# Patient Record
Sex: MALE | Race: WHITE | NOT HISPANIC OR LATINO | Employment: FULL TIME | URBAN - METROPOLITAN AREA
[De-identification: names, ages, dates, MRNs, and addresses within clinical notes are randomized per-mention and may not be internally consistent; named-entity substitution may affect disease eponyms.]

---

## 2017-01-11 ENCOUNTER — OFFICE VISIT (OUTPATIENT)
Dept: CARDIOLOGY | Facility: CLINIC | Age: 42
End: 2017-01-11

## 2017-01-11 VITALS
BODY MASS INDEX: 34.85 KG/M2 | HEART RATE: 79 BPM | SYSTOLIC BLOOD PRESSURE: 138 MMHG | HEIGHT: 73 IN | DIASTOLIC BLOOD PRESSURE: 100 MMHG | WEIGHT: 263 LBS

## 2017-01-11 DIAGNOSIS — R07.2 PRECORDIAL PAIN: ICD-10-CM

## 2017-01-11 DIAGNOSIS — R00.2 PALPITATIONS: Primary | ICD-10-CM

## 2017-01-11 PROCEDURE — 99204 OFFICE O/P NEW MOD 45 MIN: CPT | Performed by: INTERNAL MEDICINE

## 2017-01-11 PROCEDURE — 93000 ELECTROCARDIOGRAM COMPLETE: CPT | Performed by: INTERNAL MEDICINE

## 2017-01-11 RX ORDER — LISINOPRIL 40 MG/1
1 TABLET ORAL DAILY
Refills: 5 | COMMUNITY
Start: 2016-12-18

## 2017-01-11 RX ORDER — CLONIDINE HYDROCHLORIDE 0.2 MG/1
1 TABLET ORAL 3 TIMES DAILY
Refills: 5 | COMMUNITY
Start: 2016-12-30

## 2017-01-11 RX ORDER — AMLODIPINE BESYLATE 5 MG/1
1 TABLET ORAL DAILY
Refills: 5 | COMMUNITY
Start: 2016-12-18 | End: 2017-01-24

## 2017-01-11 RX ORDER — CLONAZEPAM 0.5 MG/1
TABLET ORAL
COMMUNITY
Start: 2017-01-10 | End: 2018-10-23

## 2017-01-11 RX ORDER — METOPROLOL SUCCINATE 100 MG/1
1 TABLET, EXTENDED RELEASE ORAL DAILY
Refills: 5 | COMMUNITY
Start: 2016-12-27
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
## 2017-01-11 NOTE — MR AVS SNAPSHOT
Jesús Saucedodylon   2017 2:40 PM   Office Visit    Dept Phone:  [phone]   Encounter #:  [id number]    Provider:  Brooks Maddox MD   Department:  [facility] CARDIOLOGY                Your Full Care Plan              Your Updated Medication List          This list is accurate as of: 17  3:36 PM.  Always use your most recent med list.                amLODIPine 5 MG tablet   Commonly known as:  NORVASC       clonazePAM 0.5 MG tablet   Commonly known as:  KlonoPIN       CloNIDine 0.2 MG tablet   Commonly known as:  CATAPRES       lisinopril 40 MG tablet   Commonly known as:  PRINIVIL,ZESTRIL       metFORMIN 1000 MG tablet   Commonly known as:  GLUCOPHAGE       metoprolol succinate  MG 24 hr tablet   Commonly known as:  TOPROL-XL               We Performed the Following     Holter Monitor - 24 Hour     Treadmill Stress Test - Rehab Protocol       You Were Diagnosed With        Codes Comments    Palpitations    -  Primary ICD-10-CM: R00.2  ICD-9-CM: 785.1       Instructions     None    Patient Instructions History      Upcoming Appointments     Visit Type Date Time Department    NEW PATIENT 2017  2:40 PM [facility] NANCY STRESS TEST ONLY [place] 2017  2:30 PM [facility] NANCY [facility] NUC CARD     NANCY HOLTER MONITOR 24 HOUR [place] 2017  3:00 PM [facility] CARD HOLTERS      [employer] Signup     [facility] [employer] allows you to send messages to your doctor, view your test results, renew your prescriptions, schedule appointments, and more. To sign up, go to [employer] and click on the Sign Up Now link in the New User? box. Enter your [employer] Activation Code exactly as it appears below along with the last four digits of your Social Security Number and your Date of Birth () to complete the sign-up process. If you do not sign up before the expiration date, you must request a new code.    [employer] Activation Code:  [email or web address]  Expires: 1/25/2017  3:36 PM    If you have questions, you can email [email or web address] or call [phone] to talk to our [employer] staff. Remember, [employer] is NOT to be used for urgent needs. For medical emergencies, dial 911.               Other Info from Your Visit           Your Appointments     Jan 20, 2017  2:30 PM EST   STRESS TEST ONLY VISIT with [facility] NUC STRESS LAB 1   [facility] NUC CARD ([place])    [facility] [address] [address] [phone]           No food or drink for 2 hours prior to your test. No caffeine or chocolate 24 hours prior to you test. (this includes coffee, tea, soda, all decaffeinated beverages, cappuccino flavorings, noooz or vivarian, diet medications, excedrin, anacin or any energy drinks) wear comfortable clothing and walking shoes. Bring your insurance cards with you. Bring all medications in their original bottles. If you are diabetic, do not take your diabetic medications after consulting with your primary care physician. if you take insulin, only take half the dose after consulting with your primary care physician. please hold the following medications for 48 hours prior to your test after consulting with your primary care physician. (acebutolo, aggrenox, atenolol, bisoprolol, betaxolol, betapace, calan, cardizem, carvadilol, coreg, corgard, corzide, dilacor xr, diltiazem, inderal, inderal la, isoptin, karlone, labetolol, levatol, nadolol, normodyne, penbutolol, pindolol, propanolol, sectral, sotalol, tenoretic, tiazic, timolol, bystolic, toprol xl, lopressor, metoprolol, trandata, verapamil, verelan, visken, zebeta, zisc)            Jan 20, 2017  3:00 PM EST   HOLTER MONITOR - 24 HOUR VISIT with [facility] LCG HOLTER   [facility] CARDIOLOGY ([facility])    [address] [address]                 Allergies     Penicillins        Reason for Visit     Chest Pain     Palpitations  "          Vital Signs     Blood Pressure Pulse Height Weight Body Mass Index Smoking Status    138/100 79 73\" (185.4 cm) 263 lb (119 kg) 34.7 kg/m2 Current Every Day Smoker      Problems and Diagnoses Noted     Palpitations    -  Primary        "

## 2017-01-20 ENCOUNTER — HOSPITAL ENCOUNTER (OUTPATIENT)
Dept: CARDIOLOGY | Facility: HOSPITAL | Age: 42
Discharge: HOME | End: 2017-01-20
Attending: INTERNAL MEDICINE | Admitting: INTERNAL MEDICINE

## 2017-01-20 DIAGNOSIS — R07.2 PRECORDIAL PAIN: Primary | ICD-10-CM

## 2017-01-20 DIAGNOSIS — I48.0 PAROXYSMAL ATRIAL FIBRILLATION (HCC): Primary | ICD-10-CM

## 2017-01-20 LAB
BH CV STRESS BP STAGE 1: NORMAL
BH CV STRESS BP STAGE 2: NORMAL
BH CV STRESS BP STAGE 3: NORMAL
BH CV STRESS BP STAGE 4: NORMAL
BH CV STRESS BP STAGE 5: NORMAL
BH CV STRESS BP STAGE 6: NORMAL
BH CV STRESS BP STAGE 7: NORMAL
BH CV STRESS DURATION MIN STAGE 1: 2
BH CV STRESS DURATION SEC STAGE 1: 32
BH CV STRESS GRADE STAGE 1: 10
BH CV STRESS HR STAGE 1: 181
BH CV STRESS METS STAGE 1: 5
BH CV STRESS PROTOCOL 1: NORMAL
BH CV STRESS RECOVERY BP: NORMAL MMHG
BH CV STRESS RECOVERY HR: 86 BPM
BH CV STRESS SPEED STAGE 1: 1.7
BH CV STRESS STAGE 1: 1
MAXIMAL PREDICTED HEART RATE: 179 BPM
PERCENT MAX PREDICTED HR: 101.12 %
STRESS BASELINE BP: NORMAL MMHG
STRESS BASELINE HR: 94 BPM
STRESS PERCENT HR: 119 %
STRESS POST ESTIMATED WORKLOAD: 3 METS
STRESS POST EXERCISE DUR MIN: 2 MIN
STRESS POST EXERCISE DUR SEC: 32 SEC
STRESS POST PEAK BP: NORMAL MMHG
STRESS POST PEAK HR: 181 BPM
STRESS TARGET HR: 152 BPM

## 2017-01-20 PROCEDURE — 93016 CV STRESS TEST SUPVJ ONLY: CPT | Performed by: INTERNAL MEDICINE

## 2017-01-20 PROCEDURE — 93017 CV STRESS TEST TRACING ONLY: CPT

## 2017-01-20 PROCEDURE — 93018 CV STRESS TEST I&R ONLY: CPT | Performed by: INTERNAL MEDICINE

## 2017-01-23 DIAGNOSIS — R07.2 PRECORDIAL PAIN: Primary | ICD-10-CM

## 2017-01-23 NOTE — PROGRESS NOTES
Kia,   The patient's cell phone has been disconnected.  Please see if we have any other contact information for him.  I want to see how his blood pressure is running, and also to tell him that we need to set him up for a different type of stress test that does not require the treadmill.  Because he went into atrial fibrillation with a rapid rate the treadmill was not helpful    I went ahead and placed the order for a [employer]

## 2017-01-24 RX ORDER — NIFEDIPINE 90 MG/1
90 TABLET, EXTENDED RELEASE ORAL DAILY
Qty: 30 TABLET | Refills: 6 | Status: SHIPPED | OUTPATIENT
Start: 2017-01-24 | End: 2017-09-05 | Stop reason: SDUPTHER

## 2017-01-27 DIAGNOSIS — I48.0 PAROXYSMAL ATRIAL FIBRILLATION (HCC): Primary | ICD-10-CM

## 2017-01-27 RX ORDER — AMIODARONE HYDROCHLORIDE 200 MG/1
TABLET ORAL
Qty: 49 TABLET | Refills: 5 | Status: SHIPPED | OUTPATIENT
Start: 2017-01-27 | End: 2017-02-23 | Stop reason: SDUPTHER

## 2017-02-01 ENCOUNTER — TELEPHONE (OUTPATIENT)
Dept: CARDIOLOGY | Facility: CLINIC | Age: 42
End: 2017-02-01

## 2017-02-01 NOTE — TELEPHONE ENCOUNTER
"02/01/17  10:51 AM  Jesús Guevara  1975    Home Phone [phone]   Mobile [phone]     This patient was transferred to me after the office contacted him to schedule his stress test. He is very anxious, and because of this, he has a hard time answering questions.     He states he feels like he's dying. He is SOA which he states is made worse by anxiety. He is sweaty and fatigued. This has been occurring since Monday. He states he has not felt the need to go to the ER. He states he has previously been seen in the ER at [facility] and that is how he was referred to our office. Because of this, he does not feel that going to the ER will be beneficial. He denies chest pain. He cannot tell me what his heart rate or blood pressure has been.     He states he is wearing an event monitor and he has been sending in multiple transmissions. You have a couple of transmissions in your inbox: one was NSR with HR in upper 90s and one was Afib/ Aflutter with HR in upper 90's. I see the note that amiodarone was added on Friday. I inquired about his medications; he is unable to tell me the names of the medications he is taking. However, he states that he did not get the message about adding a new medication on Friday, 1/27. I advised him that he is to start taking 200mg amiodarone BID for two weeks, then switch to 200mg daily.     I'm fairly certain that he started the nefidipine last week; he states, \"I started a new, large, round pill last week.\" He states he is taking clonidine, metoprolol at night, and amlodipine. He became upset when I asked about the lisinopril.    1. Do you still want to schedule Lexican on this patient?  2. Does he need to go to ER for his current condition? Is this something for which he needs to be seen in CP unit?  3. Do you still want him to have the lab work ordered last Friday?    Kendra RAMIREZ RN    "

## 2017-02-01 NOTE — TELEPHONE ENCOUNTER
See below. Pt's BP was 160/104. I called to see if I could get a heart rate on him. Also he did take the Amiodarone around 12p today.    Kia

## 2017-02-01 NOTE — TELEPHONE ENCOUNTER
02/01/17  11:38 AM  Called Mr. Guevara and instructed him to start amiodarone 200mg BID x 2 weeks; then go to 200mg daily. I instructed him to check BP/ HR and call back with results. I instructed him to go to [facility] lab for thyroid panel and CMP. I told him that I would have someone call him to schedule Lexiscan.     I also let him know that based on how he's feeling he should follow-up with PCP for treatment for anxiety. He became very upset and tearful. He states no one will help him. He states his PCP, Dr. Wiley is refusing to prescribe anything for anxiety because she feels his issue is cardiac related. I explained that Dr. Maddox is able to treat his issues with heart rhythm; I explained that if his BP/ HR are abnormal to call me so that possible adjustments could be made. I also explained that Dr. Maddox has ordered further testing to rule out other possible cardiac issues. I also advised that if he needs immediate attention for anxiety and is not able to follow-up with Dr. Wiley, to be seen in an ER.    He states he is going to  amio and will have BP/ HR checked at [facility]. He asked if Dr. Maddox could contact Dr. Wiley about possibly refilling his clonidine?     Kendra RAMIREZ RN

## 2017-02-03 ENCOUNTER — HOSPITAL ENCOUNTER (OUTPATIENT)
Dept: CARDIOLOGY | Facility: HOSPITAL | Age: 42
Discharge: HOME | End: 2017-02-03
Attending: INTERNAL MEDICINE | Admitting: INTERNAL MEDICINE

## 2017-02-03 LAB
BH CV STRESS BP STAGE 1: NORMAL
BH CV STRESS COMMENTS STAGE 1: NORMAL
BH CV STRESS DOSE REGADENOSON STAGE 1: 0.4
BH CV STRESS DURATION MIN STAGE 1: 0
BH CV STRESS DURATION SEC STAGE 1: 15
BH CV STRESS HR STAGE 1: 108
BH CV STRESS PROTOCOL 1: NORMAL
BH CV STRESS RECOVERY BP: NORMAL MMHG
BH CV STRESS RECOVERY HR: 90 BPM
BH CV STRESS STAGE 1: 1
LV EF NUC BP: 55 %
MAXIMAL PREDICTED HEART RATE: 179 BPM
PERCENT MAX PREDICTED HR: 60.34 %
STRESS BASELINE BP: NORMAL MMHG
STRESS BASELINE HR: 82 BPM
STRESS PERCENT HR: 71 %
STRESS POST EXERCISE DUR SEC: 15 SEC
STRESS POST PEAK BP: NORMAL MMHG
STRESS POST PEAK HR: 108 BPM
STRESS TARGET HR: 152 BPM

## 2017-02-03 PROCEDURE — A9502 TC99M TETROFOSMIN: HCPCS | Performed by: INTERNAL MEDICINE

## 2017-02-03 PROCEDURE — 93018 CV STRESS TEST I&R ONLY: CPT | Performed by: INTERNAL MEDICINE

## 2017-02-03 PROCEDURE — 0 TECHNETIUM TETROFOSMIN KIT: Performed by: INTERNAL MEDICINE

## 2017-02-03 PROCEDURE — 25010000002 REGADENOSON 0.4 MG/5ML SOLUTION: Performed by: INTERNAL MEDICINE

## 2017-02-03 PROCEDURE — 93016 CV STRESS TEST SUPVJ ONLY: CPT | Performed by: INTERNAL MEDICINE

## 2017-02-03 PROCEDURE — 93017 CV STRESS TEST TRACING ONLY: CPT

## 2017-02-03 PROCEDURE — 78452 HT MUSCLE IMAGE SPECT MULT: CPT | Performed by: INTERNAL MEDICINE

## 2017-02-03 PROCEDURE — 78452 HT MUSCLE IMAGE SPECT MULT: CPT

## 2017-02-03 RX ADMIN — REGADENOSON 0.4 MG: 0.08 INJECTION, SOLUTION INTRAVENOUS at 14:21

## 2017-02-03 RX ADMIN — TETROFOSMIN 1 DOSE: 1.38 INJECTION, POWDER, LYOPHILIZED, FOR SOLUTION INTRAVENOUS at 14:20

## 2017-02-03 RX ADMIN — TETROFOSMIN 1 DOSE: 1.38 INJECTION, POWDER, LYOPHILIZED, FOR SOLUTION INTRAVENOUS at 13:19

## 2017-02-07 ENCOUNTER — TELEPHONE (OUTPATIENT)
Dept: CARDIOLOGY | Facility: CLINIC | Age: 42
End: 2017-02-07

## 2017-02-07 NOTE — TELEPHONE ENCOUNTER
I called him with the results and was going to schedule him.  He said that since he started the Amiodarone on Wed of last week he has been feeling dizzy and having tunnel vision. He also said that on Sat he came very close to passing out. This was apparently a new thing. I asked about vitals and he said his BP was running 120's/80's but he didn't have a record of his HR.     He also mentioned he would be seeing Dr. Wiley on 2/10/17 and the psychologist on 2/16/17.     He also mentioned that he was approved for intermittent FMLA through his PCP and they are asking for records from us. He said he took off the last 2 days due to the dizziness, as he didn't feel comfortable driving himself. He plans on returning tomorrow or Thursday.    I didn't know if you wanted to change the 1 month f/u plan or do anything additional with the new dizziness and presyncope he has had since starting the Amiodarone. Please advise.  Thanks,  Kia

## 2017-02-07 NOTE — TELEPHONE ENCOUNTER
----- Message from Brooks Maddox MD sent at 2/6/2017 10:24 AM EST -----  Tell him stress is normal and see if he will come back in in the next month

## 2017-02-23 ENCOUNTER — OFFICE VISIT (OUTPATIENT)
Dept: CARDIOLOGY | Facility: CLINIC | Age: 42
End: 2017-02-23

## 2017-02-23 VITALS
DIASTOLIC BLOOD PRESSURE: 72 MMHG | HEIGHT: 73 IN | WEIGHT: 260 LBS | BODY MASS INDEX: 34.46 KG/M2 | HEART RATE: 65 BPM | SYSTOLIC BLOOD PRESSURE: 128 MMHG

## 2017-02-23 DIAGNOSIS — I48.0 PAROXYSMAL ATRIAL FIBRILLATION (HCC): Primary | ICD-10-CM

## 2017-02-23 DIAGNOSIS — E11.9 TYPE 2 DIABETES MELLITUS WITHOUT COMPLICATION, WITHOUT LONG-TERM CURRENT USE OF INSULIN (HCC): ICD-10-CM

## 2017-02-23 DIAGNOSIS — I10 ESSENTIAL HYPERTENSION: ICD-10-CM

## 2017-02-23 PROCEDURE — 93000 ELECTROCARDIOGRAM COMPLETE: CPT | Performed by: INTERNAL MEDICINE

## 2017-02-23 PROCEDURE — 99214 OFFICE O/P EST MOD 30 MIN: CPT | Performed by: INTERNAL MEDICINE

## 2017-02-23 RX ORDER — TRAMADOL HYDROCHLORIDE 50 MG/1
1 TABLET ORAL AS NEEDED
Refills: 5 | COMMUNITY
Start: 2017-02-15

## 2017-02-23 RX ORDER — AMIODARONE HYDROCHLORIDE 100 MG/1
100 TABLET ORAL DAILY
Qty: 30 TABLET | Refills: 6 | Status: SHIPPED | OUTPATIENT
Start: 2017-02-23 | End: 2018-01-30 | Stop reason: SDUPTHER

## 2017-02-23 NOTE — PROGRESS NOTES
Jesús Guevara  1975  Date of Office Visit: 02/23/2017  Encounter Provider: Brooks Maddox MD  Place of Service: [facility] CARDIOLOGY      CHIEF COMPLAINT:   1.   Followup paroxysmal atrial fibrillation.   2. Essential hypertension.         HISTORY OF PRESENT ILLNESS:  Dr. Wiley,   I had the pleasure of seeing your patient, Jesús Guevara back in followup today. As you well know, he is a very pleasant 41-year-old gentleman with a medical history of anxiety disorder, essential hypertension, and obstructive sleep apnea with noncompliance who presented with palpitations and mild shortness of air. He was found on his treadmill stress test to have conversion to atrial fibrillation with rapid ventricular rate and stated that this was actually similar to symptoms he has had in the past.      He reported palpitations and shortness of air; both of which were mild. This was associated with anxiety. No chest pain. At that time he was placed on amiodarone therapy along with aspirin and his metoprolol was continued. He converted to sinus rhythm and maintains. He denies any palpitations currently. No orthopnea, PND or lower extremity edema. Overall, he feels that he is doing well after change in his antidepressant therapy and his is now seeing a psychologist.    Review of Systems   Constitution: Negative for fever, weakness and malaise/fatigue.   HENT: Negative for nosebleeds and sore throat.    Eyes: Negative for blurred vision and double vision.   Cardiovascular: Negative for chest pain, claudication, palpitations and syncope.   Respiratory: Negative for cough, shortness of breath and snoring.    Endocrine: Negative for cold intolerance, heat intolerance and polydipsia.   Skin: Negative for itching, poor wound healing and rash.   Musculoskeletal: Negative for joint pain, joint swelling, muscle weakness and myalgias.   Gastrointestinal: Negative for abdominal pain, melena, nausea and  "vomiting.   Neurological: Negative for light-headedness, loss of balance, seizures and vertigo.   Psychiatric/Behavioral: Negative for altered mental status and depression.          Past Medical History   Diagnosis Date   • Allergic rhinitis    • Cellulitis and abscess of head    • Cervical spinal stenosis    • Cervicalgia    • Chest wall pain    • Chicken pox    • Contact dermatitis    • Dental caries    • Diabetes mellitus    • Disc disorder    • Follicular disorder    • Generalized anxiety disorder    • Hypertension    • Insomnia    • LILY (obstructive sleep apnea)    • SOB (shortness of breath)        The following portions of the patient's history were reviewed and updated as appropriate: Social history , Family history and Surgical history     Current Outpatient Prescriptions on File Prior to Visit   Medication Sig Dispense Refill   • clonazePAM (KlonoPIN) 0.5 MG tablet      • CloNIDine (CATAPRES) 0.2 MG tablet Take 1 tablet by mouth 3 (Three) Times a Day.  5   • lisinopril (PRINIVIL,ZESTRIL) 40 MG tablet Take 1 tablet by mouth Daily.  5   • metFORMIN (GLUCOPHAGE) 1000 MG tablet Take 1 tablet by mouth 2 (Two) Times a Day.  5   • metoprolol succinate XL (TOPROL-XL) 100 MG 24 hr tablet Take 1 tablet by mouth Daily.  5   • NIFEdipine XL (PROCARDIA XL) 90 MG 24 hr tablet Take 1 tablet by mouth Daily. 30 tablet 6   • [DISCONTINUED] amiodarone (PACERONE) 200 MG tablet Take 1 tablet bid for 2 weeks, then move to 1 table qdaily. (Patient taking differently: Take 200 mg by mouth Daily. Take 1 tablet bid for 2 weeks, then move to 1 table qdaily.) 49 tablet 5     No current facility-administered medications on file prior to visit.        Allergies   Allergen Reactions   • Penicillins        Vitals:    02/23/17 1421   BP: 128/72   Pulse: 65   Weight: 260 lb (118 kg)   Height: 73\" (185.4 cm)     Physical Exam   Constitutional: He is oriented to person, place, and time. He appears well-developed and well-nourished.   HENT: "   Head: Normocephalic and atraumatic.   Eyes: Conjunctivae and EOM are normal. No scleral icterus.   Neck: Normal range of motion. Neck supple. Normal carotid pulses, no hepatojugular reflux and no JVD present. Carotid bruit is not present. No tracheal deviation present. No thyromegaly present.   Cardiovascular: Normal rate and regular rhythm.  Exam reveals no gallop and no friction rub.    No murmur heard.  Pulses:       Carotid pulses are 2+ on the right side, and 2+ on the left side.       Radial pulses are 2+ on the right side, and 2+ on the left side.        Femoral pulses are 2+ on the right side, and 2+ on the left side.       Dorsalis pedis pulses are 2+ on the right side, and 2+ on the left side.        Posterior tibial pulses are 2+ on the right side, and 2+ on the left side.   Pulmonary/Chest: Breath sounds normal. No respiratory distress. He has no decreased breath sounds. He has no wheezes. He has no rhonchi. He has no rales. He exhibits no tenderness.   Abdominal: Soft. Bowel sounds are normal. He exhibits no distension. There is no tenderness. There is no rebound.   Musculoskeletal: He exhibits no edema or deformity.   Neurological: He is alert and oriented to person, place, and time. He has normal strength. No sensory deficit.   Skin: No rash noted. No erythema.   Psychiatric: He has a normal mood and affect. His behavior is normal.     No components found for: CBC  No results found for: CMP  No components found for: LIPID  No results found for: BMP      ECG 12 Lead  Date/Time: 2/23/2017 3:17 PM  Performed by: DEANN SMITH  Authorized by: DEANN SMITH   Comparison: compared with previous ECG from 1/11/2017  Rhythm: sinus rhythm  Rate: normal  Conduction: conduction normal  ST Segments: ST segments normal  T Waves: T waves normal  QRS axis: normal  Clinical impression: normal ECG              DISCUSSION/SUMMARY The patient is a very pleasant 41-year-old gentleman with a medical  history of depression and anxiety, resistant hypertension on multiple blood pressure medications, obstructive sleep apnea with previous CPAP noncompliance who presented with palpitations.  He was found to have atrial fibrillation with a rapid ventricular rate on a treadmill stress test.  A monitor was placed and he was started on amiodarone therapy.  His metoprolol was continued.  He converted to sinus rhythm and is maintaining a sinus rhythm currently.  He is on aspirin therapy.      He has also seen a psychologist and states that with an alteration in his therapy, including an antidepressant, that he feels great.      1. Atrial fibrillation; paroxysmal.  Decrease amiodarone to 100 mg daily.  Continue aspirin.  Continue Toprol at current dose.  Previous stress test with a fixed inferior wall defect but no other significant findings.  The patient has not had an echo and I will plan on getting that at our next appointment.  In the future I would like to have him off amiodarone therapy.  I will give him another three months of 100 mg daily and plan on stopping it after that.  If he has a recurrence, I think we should refer him over to Dr. Cramer for an atrial fibrillation ablation.   2. Essential hypertension; at goal.      I will plan to see the patient back in the office in three months and reassessing his rhythm at that time.       In addition, if the patient has a recurrence, we will start him on anticoagulation.          Atrial Fibrillation and Atrial Flutter  Assessment  • The patient has paroxysmal atrial fibrillation  • This is non-valvular in etiology  • The patient's CHADS2-VASc score is 2  • A [id number] score of 2 or more is considered a high risk for a thromboembolic event  • Aspirin prescribed    Plan  • Attempt to maintain sinus rhythm  • Continue aspirin for antithrombotic therapy, bleeding issues discussed  • Continue amiodarone for rhythm control  • Continue beta blocker for rate control

## 2017-02-27 LAB
Lab: 12
TOAL ENROLLMENT DAYS: 30

## 2017-04-24 ENCOUNTER — APPOINTMENT (OUTPATIENT)
Dept: GENERAL RADIOLOGY | Facility: HOSPITAL | Age: 42
End: 2017-04-24

## 2017-04-24 ENCOUNTER — HOSPITAL ENCOUNTER (EMERGENCY)
Facility: HOSPITAL | Age: 42
Discharge: HOME | End: 2017-04-24
Attending: EMERGENCY MEDICINE | Admitting: EMERGENCY MEDICINE

## 2017-04-24 VITALS
HEIGHT: 73 IN | TEMPERATURE: 98 F | DIASTOLIC BLOOD PRESSURE: 85 MMHG | HEART RATE: 77 BPM | WEIGHT: 240 LBS | OXYGEN SATURATION: 97 % | SYSTOLIC BLOOD PRESSURE: 135 MMHG | BODY MASS INDEX: 31.81 KG/M2 | RESPIRATION RATE: 16 BRPM

## 2017-04-24 DIAGNOSIS — S83.91XA SPRAIN OF RIGHT KNEE, UNSPECIFIED LIGAMENT, INITIAL ENCOUNTER: Primary | ICD-10-CM

## 2017-04-24 PROCEDURE — 99283 EMERGENCY DEPT VISIT LOW MDM: CPT

## 2017-04-24 PROCEDURE — 73560 X-RAY EXAM OF KNEE 1 OR 2: CPT

## 2017-04-24 RX ORDER — DIAZEPAM 5 MG/1
5 TABLET ORAL 2 TIMES DAILY PRN
COMMUNITY

## 2017-04-24 RX ORDER — DICLOFENAC SODIUM 75 MG/1
75 TABLET, DELAYED RELEASE ORAL 2 TIMES DAILY PRN
Qty: 20 TABLET | Refills: 0 | Status: SHIPPED | OUTPATIENT
Start: 2017-04-24 | End: 2018-10-23

## 2017-04-24 NOTE — ED PROVIDER NOTES
" EMERGENCY DEPARTMENT ENCOUNTER    CHIEF COMPLAINT  Chief Complaint: Knee Injury  History given by: Patient  History limited by: N/A  Room Number: HALE/E  PMD: Cara Wiley MD      HPI:  Pt is a 41 y.o. male who presents complaining of R knee injury onset around 1130 today. Pt states that he was walking when someone bumped into him laterally causing his knee to bend medially. Pt reports pain with ambulation, but states that he is able to ambulate. Pt states that his pain has slightly improved since arriving to the ED. Pt reports a hx of knee injury. Pt states that he has previously had his knee scoped and some sort of ligament repair, but is unsure of what type.    Duration: 3 hours  Onset:  Gradual  Timing: Constant  Location: R knee  Radiation: Does not radiate  Quality: \"pain\"  Intensity/Severity: Moderate  Progression: Improved  Associated Symptoms: None  Aggravating Factors: Ambulation  Alleviating Factors: None  Previous Episodes: Hx of knee injury  Treatment before arrival: None    PAST MEDICAL HISTORY  Active Ambulatory Problems     Diagnosis Date Noted   • Paroxysmal atrial fibrillation 02/23/2017   • Essential hypertension 02/23/2017   • Type 2 diabetes mellitus without complication 02/23/2017     Resolved Ambulatory Problems     Diagnosis Date Noted   • No Resolved Ambulatory Problems     Past Medical History:   Diagnosis Date   • Allergic rhinitis    • Cellulitis and abscess of head    • Cervical spinal stenosis    • Cervicalgia    • Chest wall pain    • Chicken pox    • Contact dermatitis    • Dental caries    • Diabetes mellitus    • Disc disorder    • Follicular disorder    • Generalized anxiety disorder    • Hypertension    • Insomnia    • LILY (obstructive sleep apnea)    • SOB (shortness of breath)        PAST SURGICAL HISTORY  Past Surgical History:   Procedure Laterality Date   • BACK SURGERY     • CERVICAL FUSION     • HIP SURGERY     • KNEE SURGERY         FAMILY HISTORY  Family " History   Problem Relation Age of Onset   • Hypertension Mother    • No Known Problems Father    • Coronary artery disease Maternal Grandmother    • Coronary artery disease Maternal Grandfather    • Diabetes Maternal Grandfather    • Coronary artery disease Paternal Grandmother    • Coronary artery disease Paternal Grandfather    • Diabetes Paternal Grandfather        SOCIAL HISTORY  Social History     Social History   • Marital status: Single     Spouse name: N/A   • Number of children: 1   • Years of education: N/A     Occupational History   • Teacher and       Social History Main Topics   • Smoking status: Current Every Day Smoker     Packs/day: 0.25   • Smokeless tobacco: Not on file   • Alcohol use No   • Drug use: No   • Sexual activity: Not on file     Other Topics Concern   • Not on file     Social History Narrative       ALLERGIES  Penicillins    REVIEW OF SYSTEMS  Review of Systems   Constitutional: Negative for chills and fever.   HENT: Negative for congestion and sore throat.    Respiratory: Negative for cough and shortness of breath.    Cardiovascular: Negative for chest pain and leg swelling.   Gastrointestinal: Negative for abdominal pain, diarrhea and vomiting.   Genitourinary: Negative for decreased urine volume and dysuria.   Musculoskeletal: Negative for neck pain.        R knee injury   Skin: Negative for pallor and rash.   Neurological: Negative for weakness, numbness and headaches.   All other systems reviewed and are negative.      PHYSICAL EXAM  ED Triage Vitals   Temp Heart Rate Resp BP SpO2   04/24/17 1231 04/24/17 1231 04/24/17 1236 04/24/17 1236 04/24/17 1231   98.9 °F (37.2 °C) 96 16 163/113 96 %      Temp src Heart Rate Source Patient Position BP Location FiO2 (%)   04/24/17 1231 04/24/17 1416 04/24/17 1416 04/24/17 1416 --   Tympanic Monitor Sitting Left arm        Physical Exam   Constitutional: He is oriented to person, place, and time and well-developed, well-nourished, and in  no distress.   Cardiovascular: Normal rate and regular rhythm.    Pulmonary/Chest: Effort normal and breath sounds normal. No respiratory distress.   Musculoskeletal:        Right knee: He exhibits no LCL laxity, normal patellar mobility and no MCL laxity. Tenderness found. Patellar tendon (mild anterior) tenderness noted. No medial joint line and no lateral joint line tenderness noted.   Neurological: He is alert and oriented to person, place, and time. He has normal sensation and normal strength.   Skin: Skin is warm and dry. Abrasion (well healing abrasion to R knee) noted.   Psychiatric: Affect normal.   Nursing note and vitals reviewed.      LAB RESULTS  Lab Results (last 24 hours)     ** No results found for the last 24 hours. **          RADIOLOGY  XR Knee 1 or 2 View Right   Final Result         XR R Knee - 1. Normal osseous structures. 2. Question joint effusion. Interpreted by radiologist and reviewed by me.    I ordered the above noted radiological studies. Interpreted by radiologist. Reviewed by me in PACS.       PROCEDURES  Procedures      PROGRESS AND CONSULTS  ED Course     2:38 PM:  Vitals: BP: 133/83 HR: 70 Temp: 98.9 °F (37.2 °C) (Tympanic) O2 sat: 96%  D/w pt normal XR and plan to follow up with ortho. Discussed with pt plan for discharge. Pt understands and agrees with the plan, all questions answered.  Will immobilize and crutch.     1455- Reviewed pt's history and workup with Dr. Dupree. After bedside evaluation, Dr. Dupree agrees with the plan of care.      MEDICAL DECISION MAKING  Results were reviewed/discussed with the patient and they were also made aware of online access.     MDM  Number of Diagnoses or Management Options     Amount and/or Complexity of Data Reviewed  Tests in the radiology section of CPT®: reviewed and ordered           DIAGNOSIS  Final diagnoses:   Sprain of right knee, unspecified ligament, initial encounter       DISPOSITION  1506- DISCHARGE    Patient discharged in  stable condition.    Reviewed implications of results, diagnosis, meds, responsibility to follow up, warning signs and symptoms of possible worsening, potential complications and reasons to return to ER.    Patient/Family voiced understanding of above instructions.    Discussed plan for discharge, as there is no emergent indication for admission.  Pt/family is agreeable and understands need for follow up and repeat testing.  Pt is aware that discharge does not mean that nothing is wrong but it indicates no emergency is present that requires admission and they must continue care with follow-up as given below or physician of their choice.     FOLLOW-UP  Lulu Mcginnis MD  [address] [facility] [address]  [place] [id number]  [phone]    Schedule an appointment as soon as possible for a visit in 2 days  if no improvement         Medication List      New Prescriptions          diclofenac 75 MG EC tablet   Commonly known as:  VOLTAREN   Take 1 tablet by mouth 2 (Two) Times a Day As Needed (pain).         Stop          clonazePAM 0.5 MG tablet   Commonly known as:  KlonoPIN       NIFEdipine XL 90 MG 24 hr tablet   Commonly known as:  PROCARDIA XL           Latest Documented Vital Signs:  As of 2:55 PM  BP- 133/83 HR- 70 Temp- 98.9 °F (37.2 °C) (Tympanic) O2 sat- 96%    --  Documentation assistance provided by wyatt Amezcua for PATRICK Crowley.  Information recorded by the scribe was done at my direction and has been verified and validated by me.           Meet Amezcua  04/24/17 [id number]       PATRICK Dexter  04/25/17 0010

## 2017-04-24 NOTE — ED NOTES
Right knee injury w child hitting lateral side of knee while at work today. +scabbing noted from last week's activity. Pt walks w light limp but able to fully wt bear.      Althea Islas RN  04/24/17 [id number]

## 2017-04-24 NOTE — ED PROVIDER NOTES
41 y.o. male presents c/o a R knee injury onset 1130 today when someone bumped into the side of his knee causing it to bend inwards. Pt reports the pain is exacerbated by ambulation.    On exam:    Pt is AOx3 & in NAD. Pt has inferior patellar tendon tenderness w/ no laxity.    Results/Plan:    I agree w/ plan to discharge pt w/ a splint & knee immobilizer.    I supervised care provided by the midlevel provider.  We have discussed this patient's history, physical exam, and treatment plan.  I have reviewed the note and personally saw and examined the patient and agree with the plan of care.    --  Documentation assistance provided by wyatt Thompson for Dr. Dupree.  Information recorded by the scribe was done at my direction and has been verified and validated by me.     Destiny Thompson  04/24/17 [id number]       Jared Dupree MD  04/24/17 7977

## 2017-04-24 NOTE — ED NOTES
"Pt c/o right knee pain after someone bumped into him today around 1140 and he \"hyper extended\" it.     Brittny Davis RN  04/24/17 0526    "

## 2017-09-06 RX ORDER — NIFEDIPINE 90 MG/1
TABLET, EXTENDED RELEASE ORAL
Qty: 30 TABLET | Refills: 5 | Status: SHIPPED | OUTPATIENT
Start: 2017-09-06

## 2018-01-30 RX ORDER — AMIODARONE HYDROCHLORIDE 100 MG/1
TABLET ORAL
Qty: 30 TABLET | Refills: 0 | Status: SHIPPED | OUTPATIENT
Start: 2018-01-30 | End: 2018-03-01 | Stop reason: SDUPTHER

## 2018-03-02 RX ORDER — AMIODARONE HYDROCHLORIDE 100 MG/1
TABLET ORAL
Qty: 30 TABLET | Refills: 0 | Status: SHIPPED | OUTPATIENT
Start: 2018-03-02 | End: 2018-04-06 | Stop reason: SDUPTHER

## 2018-04-06 RX ORDER — AMIODARONE HYDROCHLORIDE 100 MG/1
TABLET ORAL
Qty: 30 TABLET | Refills: 0 | Status: SHIPPED | OUTPATIENT
Start: 2018-04-06 | End: 2018-10-23

## 2018-07-03 ENCOUNTER — TRANSCRIBE ORDERS (OUTPATIENT)
Dept: ADMINISTRATIVE | Facility: HOSPITAL | Age: 43
End: 2018-07-03

## 2018-07-03 DIAGNOSIS — M54.2 NECK PAIN: Primary | ICD-10-CM

## 2018-10-23 ENCOUNTER — HOSPITAL ENCOUNTER (EMERGENCY)
Facility: HOSPITAL | Age: 43
Discharge: HOME | End: 2018-10-23
Attending: EMERGENCY MEDICINE | Admitting: EMERGENCY MEDICINE

## 2018-10-23 ENCOUNTER — APPOINTMENT (OUTPATIENT)
Dept: CT IMAGING | Facility: HOSPITAL | Age: 43
End: 2018-10-23

## 2018-10-23 VITALS
BODY MASS INDEX: 31.81 KG/M2 | HEART RATE: 52 BPM | RESPIRATION RATE: 16 BRPM | DIASTOLIC BLOOD PRESSURE: 104 MMHG | OXYGEN SATURATION: 97 % | TEMPERATURE: 99.5 F | SYSTOLIC BLOOD PRESSURE: 143 MMHG | HEIGHT: 73 IN | WEIGHT: 240 LBS

## 2018-10-23 DIAGNOSIS — S09.90XA CLOSED HEAD INJURY, INITIAL ENCOUNTER: ICD-10-CM
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
## 2018-10-23 DIAGNOSIS — R55 NEAR SYNCOPE: Primary | ICD-10-CM

## 2018-10-23 LAB
ALBUMIN SERPL-MCNC: 4.2 G/DL (ref 3.5–5.2)
ALBUMIN/GLOB SERPL: 1.4 G/DL
ALP SERPL-CCNC: 54 U/L (ref 39–117)
ALT SERPL W P-5'-P-CCNC: 33 U/L (ref 1–41)
ANION GAP SERPL CALCULATED.3IONS-SCNC: 13.4 MMOL/L
AST SERPL-CCNC: 20 U/L (ref 1–40)
BASOPHILS # BLD AUTO: 0.02 10*3/MM3 (ref 0–0.2)
BASOPHILS NFR BLD AUTO: 0.2 % (ref 0–1.5)
BILIRUB SERPL-MCNC: 0.3 MG/DL (ref 0.1–1.2)
BUN BLD-MCNC: 12 MG/DL (ref 6–20)
BUN/CREAT SERPL: 16 (ref 7–25)
CALCIUM SPEC-SCNC: 9.3 MG/DL (ref 8.6–10.5)
CHLORIDE SERPL-SCNC: 102 MMOL/L (ref 98–107)
CO2 SERPL-SCNC: 24.6 MMOL/L (ref 22–29)
CREAT BLD-MCNC: 0.75 MG/DL (ref 0.76–1.27)
DEPRECATED RDW RBC AUTO: 39.5 FL (ref 37–54)
EOSINOPHIL # BLD AUTO: 0.09 10*3/MM3 (ref 0–0.7)
EOSINOPHIL NFR BLD AUTO: 1 % (ref 0.3–6.2)
ERYTHROCYTE [DISTWIDTH] IN BLOOD BY AUTOMATED COUNT: 12.2 % (ref 11.5–14.5)
GFR SERPL CREATININE-BSD FRML MDRD: 114 ML/MIN/1.73
GLOBULIN UR ELPH-MCNC: 2.9 GM/DL
GLUCOSE BLD-MCNC: 245 MG/DL (ref 65–99)
HCT VFR BLD AUTO: 38.5 % (ref 40.4–52.2)
HGB BLD-MCNC: 13.4 G/DL (ref 13.7–17.6)
IMM GRANULOCYTES # BLD: 0.03 10*3/MM3 (ref 0–0.03)
IMM GRANULOCYTES NFR BLD: 0.3 % (ref 0–0.5)
LYMPHOCYTES # BLD AUTO: 2.56 10*3/MM3 (ref 0.9–4.8)
LYMPHOCYTES NFR BLD AUTO: 27.8 % (ref 19.6–45.3)
MCH RBC QN AUTO: 30.9 PG (ref 27–32.7)
MCHC RBC AUTO-ENTMCNC: 34.8 G/DL (ref 32.6–36.4)
MCV RBC AUTO: 88.7 FL (ref 79.8–96.2)
MONOCYTES # BLD AUTO: 0.73 10*3/MM3 (ref 0.2–1.2)
MONOCYTES NFR BLD AUTO: 7.9 % (ref 5–12)
NEUTROPHILS # BLD AUTO: 5.8 10*3/MM3 (ref 1.9–8.1)
NEUTROPHILS NFR BLD AUTO: 63.1 % (ref 42.7–76)
PLATELET # BLD AUTO: 253 10*3/MM3 (ref 140–500)
PMV BLD AUTO: 9.3 FL (ref 6–12)
POTASSIUM BLD-SCNC: 3.7 MMOL/L (ref 3.5–5.2)
PROT SERPL-MCNC: 7.1 G/DL (ref 6–8.5)
RBC # BLD AUTO: 4.34 10*6/MM3 (ref 4.6–6)
SODIUM BLD-SCNC: 140 MMOL/L (ref 136–145)
TROPONIN T SERPL-MCNC: <0.01 NG/ML (ref 0–0.03)
WBC NRBC COR # BLD: 9.2 10*3/MM3 (ref 4.5–10.7)

## 2018-10-23 PROCEDURE — 93005 ELECTROCARDIOGRAM TRACING: CPT | Performed by: PHYSICIAN ASSISTANT

## 2018-10-23 PROCEDURE — 99284 EMERGENCY DEPT VISIT MOD MDM: CPT

## 2018-10-23 PROCEDURE — 85025 COMPLETE CBC W/AUTO DIFF WBC: CPT | Performed by: PHYSICIAN ASSISTANT

## 2018-10-23 PROCEDURE — 84484 ASSAY OF TROPONIN QUANT: CPT | Performed by: PHYSICIAN ASSISTANT

## 2018-10-23 PROCEDURE — 70450 CT HEAD/BRAIN W/O DYE: CPT

## 2018-10-23 PROCEDURE — 72125 CT NECK SPINE W/O DYE: CPT

## 2018-10-23 PROCEDURE — 96360 HYDRATION IV INFUSION INIT: CPT

## 2018-10-23 PROCEDURE — 93010 ELECTROCARDIOGRAM REPORT: CPT | Performed by: INTERNAL MEDICINE

## 2018-10-23 PROCEDURE — 80053 COMPREHEN METABOLIC PANEL: CPT | Performed by: PHYSICIAN ASSISTANT

## 2018-10-23 RX ORDER — SODIUM CHLORIDE 0.9 % (FLUSH) 0.9 %
10 SYRINGE (ML) INJECTION AS NEEDED
Status: DISCONTINUED | OUTPATIENT
Start: 2018-10-23 | End: 2018-10-23 | Stop reason: HOSPADM

## 2018-10-23 RX ORDER — CYCLOBENZAPRINE HCL 10 MG
10 TABLET ORAL 3 TIMES DAILY PRN
Qty: 21 TABLET | Refills: 0 | Status: SHIPPED | OUTPATIENT
Start: 2018-10-23

## 2018-10-23 RX ORDER — GLIPIZIDE 10 MG/1
10 TABLET ORAL
COMMUNITY

## 2018-10-23 RX ORDER — DICLOFENAC SODIUM 75 MG/1
75 TABLET, DELAYED RELEASE ORAL 2 TIMES DAILY PRN
Qty: 20 TABLET | Refills: 0 | Status: SHIPPED | OUTPATIENT
Start: 2018-10-23

## 2018-10-23 RX ADMIN — SODIUM CHLORIDE 1000 ML: 9 INJECTION, SOLUTION INTRAVENOUS at 16:38
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
## 2018-10-23 NOTE — ED NOTES
C-collar removed with provider permission. Patient stood up to use the restroom and felt dizzy, so this tech encouraged him to use the urinal from the bedside.     Mary Kay Bridges  10/23/18 4668